# Patient Record
Sex: FEMALE | Race: WHITE | HISPANIC OR LATINO | URBAN - METROPOLITAN AREA
[De-identification: names, ages, dates, MRNs, and addresses within clinical notes are randomized per-mention and may not be internally consistent; named-entity substitution may affect disease eponyms.]

---

## 2024-06-28 ENCOUNTER — EMERGENCY (EMERGENCY)
Facility: HOSPITAL | Age: 19
LOS: 0 days | Discharge: HOME | End: 2024-06-28
Attending: STUDENT IN AN ORGANIZED HEALTH CARE EDUCATION/TRAINING PROGRAM
Payer: COMMERCIAL

## 2024-06-28 VITALS
SYSTOLIC BLOOD PRESSURE: 134 MMHG | WEIGHT: 169.98 LBS | DIASTOLIC BLOOD PRESSURE: 77 MMHG | HEIGHT: 64 IN | OXYGEN SATURATION: 99 % | HEART RATE: 98 BPM | TEMPERATURE: 97 F | RESPIRATION RATE: 18 BRPM

## 2024-06-28 PROCEDURE — 99283 EMERGENCY DEPT VISIT LOW MDM: CPT | Mod: 25

## 2024-06-28 PROCEDURE — 99284 EMERGENCY DEPT VISIT MOD MDM: CPT | Mod: 57

## 2024-06-28 PROCEDURE — 28510 TREATMENT OF TOE FRACTURE: CPT | Mod: T5

## 2024-06-28 PROCEDURE — 73630 X-RAY EXAM OF FOOT: CPT | Mod: RT

## 2024-06-28 PROCEDURE — 73630 X-RAY EXAM OF FOOT: CPT | Mod: 26,RT

## 2024-06-28 RX ORDER — ACETAMINOPHEN 500 MG
650 TABLET ORAL ONCE
Refills: 0 | Status: COMPLETED | OUTPATIENT
Start: 2024-06-28 | End: 2024-06-28

## 2024-06-28 RX ORDER — IBUPROFEN 200 MG
600 TABLET ORAL ONCE
Refills: 0 | Status: COMPLETED | OUTPATIENT
Start: 2024-06-28 | End: 2024-06-28

## 2024-06-28 RX ADMIN — Medication 650 MILLIGRAM(S): at 22:01

## 2024-06-28 RX ADMIN — Medication 600 MILLIGRAM(S): at 22:01

## 2024-06-28 NOTE — ED PROVIDER NOTE - PROGRESS NOTE DETAILS
pk: xray demonstrates jodee sneed tape applied, all results d/w pt & copies given, strict return precautions discussed, rec outpt f.u with podiatry referral

## 2024-06-28 NOTE — ED PROVIDER NOTE - PATIENT PORTAL LINK FT
You can access the FollowMyHealth Patient Portal offered by [facility] by registering at the following website: [email or web address]. By joining [employer]’s FollowMyHealth portal, you will also be able to view your health information using other applications (apps) compatible with our system.

## 2024-06-28 NOTE — ED PROVIDER NOTE - CLINICAL SUMMARY MEDICAL DECISION MAKING FREE TEXT BOX
19 y/o healthy female p/w R great toe pain after dropping 60 lb gym weight on her toe PTA. No other injuries. TDAP UTD.     On exam, vitals reviewed. Nail intact on great toe, some venous oozing at base. Toenail is painted which limits evaluation. Tender throughout the entire great toe.    XR shows tuft fracture. Discussed with patient that given the superficial bleeding and tuft fracture, likely has nailbed injury too. D/w pt that some literature shows similar healing with trephination alone compared to toenail removal but that standard of care currently is toenail removal and repair with sutures. Pt did not want to proceed with toenail removal and will monitor for signs of infection or poor healing.

## 2024-06-28 NOTE — ED PROVIDER NOTE - OBJECTIVE STATEMENT
Patient is a 18y Female no pmhx p/w right foot injury after Patient is a 18y Female no pmhx p/w right foot injury after dropping a weight on it just PTA. Otherwise denies any fever, chills, headache, changes in vision, cough, congestion, cp, palpitations, sob, n/v/d, abd pain, constipation, urinary complaints.

## 2024-06-28 NOTE — ED PROVIDER NOTE - NSFOLLOWUPINSTRUCTIONS_ED_ALL_ED_FT
Foot Fracture  WHAT YOU NEED TO KNOW:    A foot fracture is a break in one or more of the bones in your foot. Foot fractures are commonly caused by trauma, falls, or repeated stress injuries. Foot Anatomy    DISCHARGE INSTRUCTIONS:    Medicines:   Antibiotics: This medicine is given to help treat or prevent an infection caused by bacteria.   NSAIDs: These medicines decrease swelling and pain. NSAIDs are available without a doctor's order. Ask which medicine is right for you. Ask how much to take and when to take it. Take as directed. NSAIDs can cause stomach bleeding and kidney problems if not taken correctly.  Pain medicine: You may be given a prescription medicine to decrease pain. Do not wait until the pain is severe before you take this medicine.    Take your medicine as directed. Contact your healthcare provider if you think your medicine is not helping or if you have side effects. Tell him of her if you are allergic to any medicine. Keep a list of the medicines, vitamins, and herbs you take. Include the amounts, and when and why you take them. Bring the list or the pill bottles to follow-up visits. Carry your medicine list with you in case of an emergency.    Follow up with your healthcare provider or bone specialist as directed: You may need to return to have your splint or stitches removed. You may also need to return for tests to make sure your foot is healing. Write down your questions so you remember to ask them during your visits.    Wound care: Carefully wash the wound with soap and water. Dry the area and put on new, clean bandages as directed. Change your bandages when they get wet or dirty.    Self-care:     Rest: You may need to rest your foot and avoid activities that cause pain. For stress fractures, you will need to avoid the activity that caused the fracture until it heals. Ask when you can return to your normal activities such as work and sports.    Ice: Ice helps decrease swelling and pain. Ice may also help prevent tissue damage. Use an ice pack or put crushed ice in a plastic bag. Cover it with a towel, and place it on your foot for 15 to 20 minutes every hour as directed.    Elevate your foot: Raise your foot at or above the level of your heart as often as you can. This will help decrease swelling and pain. Prop your foot on pillows or blankets to keep it elevated comfortably.     Physical therapy: Once your foot has healed, a physical therapist can teach you exercises to help improve movement and strength, and to decrease pain.    Splint care:     Check the skin around your splint daily for any redness or open areas.  Do not use a sharp or pointed object to scratch your skin under the splint.  Do not remove your splint unless your healthcare provider or orthopedic surgeon says it is okay.  Bathing with a splint: Do not let your splint get wet. Before bathing, cover the splint with a plastic bag. Tape the bag to your skin above the splint to seal out the water. Keep your foot out of the water in case the bag leaks. Ask when it is okay to take a bath or shower.  Assistive devices: You may be given a hard-soled shoe to wear while your foot is healing. You also may need to use crutches to help you walk while your foot heals. It is important to use your crutches correctly. Ask for more information about how to use crutches.    Contact your healthcare provider or bone specialist if:     You have a fever.  You have new sores around your boot or splint.  You have new or worsening trouble moving your foot.  You notice a foul smell coming from under your splint.  Your boot or splint gets damaged.  You have questions or concerns about your condition or care.    Return to the emergency department if:     The pain in your injured foot gets worse even after you rest and take pain medicine.  The skin or toes of your foot become numb, swollen, cold, white, or blue.  You have more pain or swelling than you did before the splint was put on.  Your wound is draining fluid or pus.  Blood soaks through your bandage.  Your leg feels warm, tender, and painful. It may look swollen and red.  You suddenly feel lightheaded and short of breath.  You have chest pain when you take a deep breath or cough. You may cough up blood.

## 2024-06-28 NOTE — ED PROVIDER NOTE - CARE PROVIDER_API CALL
Wilman Bray  Podiatric Medicine and Surgery  [address] [place] [address]  Phone: [phone]  Fax: [phone]  Follow Up Time: 1-3 Days

## 2024-06-28 NOTE — ED PROVIDER NOTE - PHYSICAL EXAMINATION
CONSTITUTIONAL: well-appearing, in NAD  SKIN: Warm dry, normal skin turgor abrasion to base of right great toenail  HEAD: NCAT  EYES: EOMI, PERRLA, no scleral icterus, conjunctiva pink  ENT: normal pharynx with no erythema or exudates  NECK: Supple; non tender. Full ROM.  CARD: RRR, no murmurs.  RESP: clear to ausculation b/l. No crackles or wheezing.  ABD: soft, non-tender, non-distended, no rebound or guarding.  EXT: Full ROM, right great toe bony tenderness, no pedal edema, no calf tenderness  NEURO: normal motor. normal sensory.   PSYCH: Cooperative, appropriate.